# Patient Record
Sex: FEMALE | Race: WHITE
[De-identification: names, ages, dates, MRNs, and addresses within clinical notes are randomized per-mention and may not be internally consistent; named-entity substitution may affect disease eponyms.]

---

## 2019-11-20 ENCOUNTER — HOSPITAL ENCOUNTER (OUTPATIENT)
Dept: HOSPITAL 47 - RADMAMWWP | Age: 74
Discharge: HOME | End: 2019-11-20
Attending: OBSTETRICS & GYNECOLOGY
Payer: MEDICARE

## 2019-11-20 DIAGNOSIS — M85.80: ICD-10-CM

## 2019-11-20 DIAGNOSIS — Z12.31: Primary | ICD-10-CM

## 2019-11-20 PROCEDURE — 77080 DXA BONE DENSITY AXIAL: CPT

## 2019-11-20 PROCEDURE — 77067 SCR MAMMO BI INCL CAD: CPT

## 2019-11-20 PROCEDURE — 77063 BREAST TOMOSYNTHESIS BI: CPT

## 2019-11-20 NOTE — BD
EXAMINATION TYPE: Axial Bone Density

 

DATE OF EXAM: 11/20/2019

 

COMPARISON: 04/23/2015

 

CLINICAL HISTORY: disorder of bone

 

Height:  5'3

Weight:  156

 

FRAX RISK QUESTIONS:

 

Secondary Osteoporosis:

  

 

RISK FACTORS 

HISTORY OF: 

 

Postmenopausal woman: y

 

MEDICATIONS: 

Additional Medications: blood pressure, cholesterol 

 

 

Additional History:

 

 

EXAM MEASUREMENTS: 

Bone mineral densitometry was performed using the [employer] System.

Bone mineral density as measured about the Lumbar spine is:  

----- L1-L4(G/cm2): 0.922

T Score Values are as follows:

----- L2: -2.2

----- L3: -1.9

----- L4: -2.2

----- L1-L4: -2.2

Bone mineral density has: Decreased -0.7% since study of: 04/23/2015

 

Bone mineral density about the R hip (g/cm2): 0.794

Bone mineral density about the L hip (g/cm2): 0.772

T Score values are as follows:

-----R Neck: -1.9

-----L Neck: -1.8

-----R Total: -1.3

-----L Total: -1.2

Bone mineral density has: Decreased -1.3% since study of: 04/23/2015

 

 

 

IMPRESSION:

Osteopenia (T Score between -2.5 and -1).

 

There is slightly increased risk of fracture and the patient may be considered 

for treatment. 

 

Re-Screen 2-5 years.

 

 

 

 

 

NOTE:  T-SCORE=SD OF THE YOUNG ADULT MEAN.

## 2019-11-22 NOTE — MM
Reason for exam: screening  (asymptomatic).

Last mammogram was performed 3 years and 6 months ago.



History:

Patient is postmenopausal.

Family history of breast cancer in sister at age 61 and breast cancer in maternal 

aunt.

Took estrogen for 6 years beginning at age 56.  Took progesterone for 6 years 

beginning at age 56.



Physical Findings:

A clinical breast exam by your physician is recommended on an annual basis and 

results should be correlated with mammographic findings.



MG 3D Screening Mammo W/Cad

Bilateral CC and MLO view(s) were taken.

Prior study comparison: May 19, 2016, bilateral MG 3d screening mammo w/cad.  

March 17, 2015, left breast MG work up mamm w CAD LT.

The breast tissue is heterogeneously dense. This may lower the sensitivity of 

mammography.  Scattered secretory calcifications on the left.  No significant 

changes when compared with prior studies.





ASSESSMENT: Benign, BI-RAD 2



RECOMMENDATION:

Routine screening mammogram of both breasts in 1 year.

## 2020-11-23 ENCOUNTER — HOSPITAL ENCOUNTER (OUTPATIENT)
Dept: HOSPITAL 47 - RADMAMWWP | Age: 75
Discharge: HOME | End: 2020-11-23
Attending: OBSTETRICS & GYNECOLOGY
Payer: MEDICARE

## 2020-11-23 DIAGNOSIS — Z12.31: Primary | ICD-10-CM

## 2020-11-23 DIAGNOSIS — Z80.3: ICD-10-CM

## 2020-11-23 PROCEDURE — 77063 BREAST TOMOSYNTHESIS BI: CPT

## 2020-11-23 PROCEDURE — 77067 SCR MAMMO BI INCL CAD: CPT

## 2020-11-24 NOTE — MM
Reason for exam: screening  (asymptomatic).

Last mammogram was performed 1 year ago.



History:

Patient is postmenopausal.

Family history of breast cancer in sister at age 61 and breast cancer in maternal 

aunt.

Took estrogen for 6 years beginning at age 56.  Took progesterone for 6 years 

beginning at age 56.



Physical Findings:

A clinical breast exam by your physician is recommended on an annual basis and 

results should be correlated with mammographic findings.



MG 3D Screening Mammo W/Cad

Bilateral CC and MLO view(s) were taken.

Prior study comparison: November 20, 2019, bilateral MG 3d screening mammo w/cad. 

May 19, 2016, bilateral MG 3d screening mammo w/cad.

The breast tissue is heterogeneously dense. This may lower the sensitivity of 

mammography.  Stable benign calcifications. There is no discrete abnormality.  No 

significant changes when compared with prior studies.





ASSESSMENT: Benign, BI-RAD 2



RECOMMENDATION:

Routine screening mammogram of both breasts in 1 year.

## 2022-04-21 ENCOUNTER — HOSPITAL ENCOUNTER (OUTPATIENT)
Dept: HOSPITAL 47 - RADBDWWP | Age: 77
Discharge: HOME | End: 2022-04-21
Attending: OBSTETRICS & GYNECOLOGY
Payer: MEDICARE

## 2022-04-21 DIAGNOSIS — Z78.0: ICD-10-CM

## 2022-04-21 DIAGNOSIS — M85.89: ICD-10-CM

## 2022-04-21 DIAGNOSIS — Z12.31: Primary | ICD-10-CM

## 2022-04-21 DIAGNOSIS — Z80.3: ICD-10-CM

## 2022-04-21 PROCEDURE — 77067 SCR MAMMO BI INCL CAD: CPT

## 2022-04-21 PROCEDURE — 77080 DXA BONE DENSITY AXIAL: CPT

## 2022-04-21 PROCEDURE — 77063 BREAST TOMOSYNTHESIS BI: CPT

## 2022-04-21 NOTE — BD
EXAMINATION TYPE: Axial Bone Density

 

DATE OF EXAM: 4/21/2022

 

COMPARISON: Previous exam dated 11/20/2019

 

CLINICAL HISTORY: 76 years year old Female.  ICD-10 CODE: Z78.0 POSTMENOPAUSAL,M85.851 OSTEOPENIA,Z13
.820

 

Height:  63.5

Weight:  159.7

 

FRAX RISK QUESTIONS:

Alcohol (3 or more units per day):  NO

Family History (Parent hip fracture):  NO

Glucocorticoids (More than 3mos):  NO

           (Ex: prednisone, prednisolone, methylprednisolone, dexamethasone, and hydrocortisone).    
     

History of Fracture in Adulthood: NO

Secondary Osteoporosis:

  1.  Type 1 Diabetes: NO

  2.  Hyperthyroidism: NO

  3.  Menopause before 45: NO

  4.  Malnutrition: NO

  5.  Chronic liver disease: NO

Rheumatoid Arthritis: NO

Current Tobacco Use: NO

 

RISK FACTORS 

HISTORY OF: 

Family History of Osteoporosis: GRANDMOTHER, AUNT

 

MEDICATIONS: 

Additional Medications: REFLUX MEDS, VIT D, CALCIUM, BP MEDS, CHOLESTEROL MEDS 

 

 

Additional History:

 

 

EXAM MEASUREMENTS: 

Bone mineral densitometry was performed using the [employer] System.

Bone mineral density as measured about the Lumbar spine is:  

----- L1-L4(G/cm2): .966

T Score Values are as follows:

----- L1: -2.3

----- L2: -1.2

----- L3: -1.5

----- L4: -2.4

----- L1-L4: -1.8

Bone mineral density has: INCREASED 4.8% since study of: 11.20.2019

 

Bone mineral density about the R hip (g/cm2): .844

Bone mineral density about the L hip (g/cm2): .848

T Score values are as follows:

-----R Neck: -2.0

-----L Neck: -1.9

-----R Total: -1.3

-----L Total: -1.3

Bone mineral density has: DECREASED 0.2 % since study of: 11.20.2019

 

 

FRAX%s: The graph provided illustrates a 12.8% chance for a major osteoporotic fx and a 3.1% chance f
or the hips probability for fx in 10 years time.

 

IMPRESSION:

Osteopenia (T Score between -2.5 and -1).

 

There is slightly increased risk of fracture and the patient may be considered 

for treatment. 

 

Re-Screen 2-5 years.

 

NOTE:  T-SCORE=SD OF THE YOUNG ADULT MEAN.

## 2022-04-25 NOTE — MM
Reason for exam: screening  (asymptomatic).

Last mammogram was performed 1 year and 5 months ago.



History:

Patient is postmenopausal.

Family history of breast cancer in sister at age 61.

Took estrogen for 6 years beginning at age 56.  Took progesterone for 6 years 

beginning at age 56.



Physical Findings:

A clinical breast exam by your physician is recommended on an annual basis and 

results should be correlated with mammographic findings.



MG 3D Screening Mammo W/Cad

Bilateral CC and MLO view(s) were taken.

Prior study comparison: November 23, 2020, bilateral MG 3d screening mammo w/cad. 

November 20, 2019, bilateral MG 3d screening mammo w/cad.

There are scattered fibroglandular densities.  No significant changes when 

compared with prior studies.





ASSESSMENT: Benign, BI-RAD 2



RECOMMENDATION:

Routine screening mammogram of both breasts in 1 year.